# Patient Record
Sex: FEMALE | Race: BLACK OR AFRICAN AMERICAN | NOT HISPANIC OR LATINO | Employment: UNEMPLOYED | URBAN - METROPOLITAN AREA
[De-identification: names, ages, dates, MRNs, and addresses within clinical notes are randomized per-mention and may not be internally consistent; named-entity substitution may affect disease eponyms.]

---

## 2021-09-15 ENCOUNTER — TELEPHONE (OUTPATIENT)
Dept: OBGYN | Facility: CLINIC | Age: 21
End: 2021-09-15

## 2021-09-15 DIAGNOSIS — Z87.51 HISTORY OF PRETERM DELIVERY: Primary | ICD-10-CM

## 2021-09-15 NOTE — TELEPHONE ENCOUNTER
[facility] Call Center    Phone Message    May a detailed message be left on voicemail: no     Reason for Call: Other: Pt's Doctor in [place], Dr. Erica Nelson calling - Cell #: [phone].  Needing to get in New/Return Patient.  Had 1st baby at 33 Weeks: . Delivered here at [facility]...   Many Miscarriages and now Pregnant again at 20 Weeks 2d Gestational.   High Risk ORESTES:  Has Weekly progestersone injections: emeka & Every 2 Weeks has Cervical Length US (which were both done today on 9/15/2021) Patients CBR#: [phone] Maylin. Currently moving back to [facility].  Updated all information for Pt.     Please call Dr. Erica Berrios to get Patient scheduled and continue on with the treatment being done now.     Action Taken: Message routed to:  [facility] HECTOR    Travel Screening: Not Applicable

## 2021-09-15 NOTE — TELEPHONE ENCOUNTER
Left message for Dr Berrios to confirm-weekly 17 OHP injections beginning 9/22, Q2 week cervical lengths beginning 9/29, and OB visit due in 4 weeks.  I have held an appointment for ORESTES on 10/11 at 1030 with Dr Corbin.  Records are viewable in Care Everywhere ([facility])

## 2021-09-17 NOTE — TELEPHONE ENCOUNTER
Spoke with Dr Berrios regarding this patient.  She has moved from [place] to [place], and would like to transfer her care.  Patient is willing to travel to [place] if necessary for her cervical length US and her injections until she can be seen here.  Per Dr Berrios, she would only need a cervical length on 9/29, and then their recommendation would be to stop, as the next wouldn't be due until after patient is  24 weeks.    Will route to provider to determine if we can order her 17 OHP to start PA process, and order cervical length US or if she will need to continue care in [place] until she can establish here.

## 2021-09-20 NOTE — TELEPHONE ENCOUNTER
Called Maylin.  She states that she no longer plans to transfer care.  She would prefer to continue with Dr Berrios.    Left message for Dr Berrios's nurse, Merline at [phone] with this plan.        Arielle Montana MD  You 3 days ago     CT    Yes,   Cervical length when needed; 17 OHP weekly   See ob md on 10/11   Thank you    Message text       You  Arielle Montana MD 3 days ago     CF    Please advise-can we order her 17 OHP and cervical length, and then have her see MD 10/11?  OR, I could do ORESTES appt with CNM on 9/21.  Last option would be for pt to continue care in [place] until appt here on 10/11    Routing comment         the left paramedian falx and left posterior parietal convexity. 2.  Acute on chronic right subdural hematoma overlying the frontoparietal vertex. 3.  Mild subcortical restricted diffusion in the frontoparietal white matter and cortical restricted diffusion in the left greater than right parietal-occipital lobes in a nonvascular distribution of uncertain clinical  significance, possibly representing hypoxic-ischemic encephalopathy, post-ictal changes or cytotoxic edema related to recent trauma. 4.  Unremarkable MRA of the head. 5.  Unremarkable MRV of the head.  No dural venous sinus stenosis. 6.  Mild edema in the paraspinal musculature of the upper cervical spine which may be secondary to muscular strain.  Otherwise, unremarkable appearance of the cervical spine.      Head CT on 3/7/2020 - IMPRESSION: Unchanged size of mixed-density subdural hematomas along bilateral cerebral convexities, falx and tentorium. No midline shift or herniation.  EEG on 3/9/2020 - INTERPRETATION:  Abnormal 2-day video electroencephalogram, initial recording showed multiple electroclinical seizure activity that eventually resolved.  EEG by itself was suppressed and finding consistent with encephalopathy.      Head CT on 3/10/2020 - IMPRESSION: Postoperative appearance of interval drainage of subdural hematomas along bilateral cerebral convexities. No significant postoperative complications identified. Residual small-volume mixed-density subdural hemorrhage mainly along the posterior cerebral falx. No midline shift or brain herniation.      EEG on 3/17/2020 - INTERPRETATION:  Abnormal extended electroencephalogram, slowing of the background and more apparent slowing that localized to the left is indicative of underlying encephalopathy.      MRI brain on 3/18/2020 - INTRAPARENCHYMAL SPACES: There is no shift of midline indicators. The ventricles are stable.  The basal cisterns are visible. There is no sign of recent large territorial  arterial distribution infarct in the brain. There is no intraparenchymal hematoma or hemorrhagic contusion. There has been resolution of the previous diffusion abnormality in the cortex of the parietal occipital region. There are resolving subcortical  posterior frontal diffusion weighted abnormalities with two new punctate diffusion abnormalities over the high left parietal cortex which may represent microinfarcts, there are too superficial for axonal injury. IMPRESSION: 1.  Overall, stable and improving.      MRI cervical spine on 3/18/2020 - IMPRESSION: 1.   Stable, normal cervical spinal MRI examination.  Head ultrasound on 3/23/2020 - IMPRESSION: Examination is very limited. 1.  Right intraventricular cyst. 2.  Slight prominence of the lateral ventricles. 3.  Bilateral septated subdural collections.      EEG on 3/30/2020 - INTERPRETATION:  Abnormal electroencephalogram, slowing of the EEG tracing is indicative of underlying encephalopathy.  There is more focality to the left, there is no epileptiform activity during recording.      Head ultrasound on 4/8/2020 - IMPRESSION: Very limited exam. Again noted slight prominence of lateral ventricles and right intraventricular cyst. Small amount of extra-axial fluid.      MRI brain (fast) WO 11/10/2020 - IMPRESSION: Moderate dilatation of the lateral and third ventricles which has mildly progressed from prior MRI 03/18/2020. Interval resolution of previously visualized subdural hematomas.  No new extra-axial fluid collections are identified.    ASSESSMENT AND PLAN: Ivonne Larose with resolved hematomas. He had repeat EEG on 3/30/2020 showing no seizures, no epileptiform activity, but slowing and disorganization. He has been weaned off Keppra, Clonidine, and propranolol in the past with no further concerns for seizures. I would recommend that he continue to maximize all therapies he is receiving. I would recommend a follow up EEG at this time. Family to call following testing for results. Family to call in interim with any questions or concerns.        Ashwin Aguilera MD    Note: over 40 minutes spent speaking with family, with over 50% in consultation discussing diagnosis, treatment, and prognosis.

## 2022-05-16 ENCOUNTER — NURSE TRIAGE (OUTPATIENT)
Dept: NURSING | Facility: CLINIC | Age: 22
End: 2022-05-16
Payer: MEDICAID

## 2022-05-17 NOTE — TELEPHONE ENCOUNTER
Pt called stating since yesterday her feet has been tingling with pain rated 9/10. Pt stated her pain is constant and stops in one minutes and comes back. Pt reported her ankles area little swollen both. Pt also reported she has trouble sleeping. Pt reported she gave birth and has a syndrome 4 moths ago.     Per protocal pt was advised to be seen within 4 hours and she stated okay.      Reji Izquierdo RN  [facility]     COVID 19 Nurse Triage Plan/Patient Instructions    Please be aware that novel coronavirus (COVID-19) may be circulating in the community. If you develop symptoms such as fever, cough, or SOB or if you have concerns about the presence of another infection including coronavirus (COVID-19), please contact your health care provider or visit [email or web address].     Disposition/Instructions    In-Person Visit with provider recommended. Reference Visit Selection Guide.    Thank you for taking steps to prevent the spread of this virus.  o Limit your contact with others.  o Wear a simple mask to cover your cough.  o Wash your hands well and often.    Resources    [facility]: About COVID-19: [email or web address]    CDC: What to Do If You're Sick: www.cdc.gov/coronavirus/2019-ncov/about/steps-when-sick.html    CDC: Ending Home Isolation: www.cdc.gov/coronavirus/2019-ncov/hcp/disposition-in-home-patients.html     CDC: Caring for Someone: www.cdc.gov/coronavirus/2019-ncov/if-you-are-sick/care-for-someone.html     [facility]: Interim Guidance for Hospital Discharge to Home: www.health.[facility].mn.us/diseases/coronavirus/hcp/hospdischarge.pdf    [facility] clinical trials (COVID-19 research studies): [email or web address]-clinical-trials     Below are the COVID-19 hotlines at the [facility] ([facility]). Interpreters are available.   o For health questions: Call 468-605-3160 or 1-231.359.8868 (7 a.m. to 7 p.m.)  o For questions about schools  and childcare: Call 257-640-8504 or 1-536.843.2191 (7 a.m. to 7 p.m.)                                Reason for Disposition    [1] SEVERE pain (e.g., excruciating, unable to do any normal activities) AND [2] not improved after 2 hours of pain medicine    Additional Information    Negative: Followed a foot injury    Negative: Diabetes    Negative: Ankle pain is main symptom    Negative: Thigh or calf pain is main symptom    Negative: Entire foot is cool or blue in comparison to other foot    Negative: Purple or black skin on foot or toe    Negative: [1] Red area or streak AND [2] fever    Negative: [1] Swollen foot AND [2] fever    Negative: Patient sounds very sick or weak to the triager    Protocols used: FOOT PAIN-A-AH

## 2024-06-05 ENCOUNTER — HOSPITAL ENCOUNTER (EMERGENCY)
Facility: OTHER | Age: 24
Discharge: LEFT BEFORE DISCHARGE | End: 2024-06-05
Admitting: STUDENT IN AN ORGANIZED HEALTH CARE EDUCATION/TRAINING PROGRAM
Payer: COMMERCIAL

## 2024-06-05 VITALS
OXYGEN SATURATION: 100 % | BODY MASS INDEX: 20.89 KG/M2 | DIASTOLIC BLOOD PRESSURE: 63 MMHG | SYSTOLIC BLOOD PRESSURE: 95 MMHG | RESPIRATION RATE: 16 BRPM | TEMPERATURE: 97.8 F | HEART RATE: 95 BPM | HEIGHT: 66 IN | WEIGHT: 130 LBS

## 2024-06-05 LAB
ALBUMIN UR-MCNC: NEGATIVE MG/DL
APPEARANCE UR: CLEAR
BILIRUB UR QL STRIP: NEGATIVE
COLOR UR AUTO: YELLOW
GLUCOSE UR STRIP-MCNC: NEGATIVE MG/DL
HCG UR QL: NEGATIVE
HGB UR QL STRIP: NEGATIVE
KETONES UR STRIP-MCNC: NEGATIVE MG/DL
LEUKOCYTE ESTERASE UR QL STRIP: NEGATIVE
NITRATE UR QL: NEGATIVE
PH UR STRIP: 7.5 [PH] (ref 5–9)
RBC URINE: 1 /HPF
SP GR UR STRIP: 1.01 (ref 1–1.03)
UROBILINOGEN UR STRIP-MCNC: NORMAL MG/DL
WBC URINE: 1 /HPF

## 2024-06-05 PROCEDURE — 81025 URINE PREGNANCY TEST: CPT | Performed by: STUDENT IN AN ORGANIZED HEALTH CARE EDUCATION/TRAINING PROGRAM

## 2024-06-05 PROCEDURE — 99281 EMR DPT VST MAYX REQ PHY/QHP: CPT | Performed by: STUDENT IN AN ORGANIZED HEALTH CARE EDUCATION/TRAINING PROGRAM

## 2024-06-05 PROCEDURE — 81001 URINALYSIS AUTO W/SCOPE: CPT | Performed by: STUDENT IN AN ORGANIZED HEALTH CARE EDUCATION/TRAINING PROGRAM

## 2024-06-05 ASSESSMENT — COLUMBIA-SUICIDE SEVERITY RATING SCALE - C-SSRS
1. IN THE PAST MONTH, HAVE YOU WISHED YOU WERE DEAD OR WISHED YOU COULD GO TO SLEEP AND NOT WAKE UP?: NO
6. HAVE YOU EVER DONE ANYTHING, STARTED TO DO ANYTHING, OR PREPARED TO DO ANYTHING TO END YOUR LIFE?: NO
2. HAVE YOU ACTUALLY HAD ANY THOUGHTS OF KILLING YOURSELF IN THE PAST MONTH?: NO

## 2024-06-05 NOTE — ED TRIAGE NOTES
Pt presents to ED with c/o all over abd pain. Pt is unsure if she is pregnant but last menstrual cycle was 5/23. Pt reports being sexually active without birth control. Pt's abd started several days. Worse today.    Cyndy Rojas RN on 6/5/2024 at 11:00 AM       Triage Assessment (Adult)       Row Name 06/05/24 1059          Cognitive/Neuro/Behavioral WDL    Cognitive/Neuro/Behavioral WDL WDL

## 2025-06-08 ENCOUNTER — HOSPITAL ENCOUNTER (OUTPATIENT)
Facility: OTHER | Age: 25
Discharge: HOME | End: 2025-06-08
Attending: OBSTETRICS & GYNECOLOGY | Admitting: OBSTETRICS & GYNECOLOGY
Payer: COMMERCIAL

## 2025-06-08 VITALS
HEART RATE: 115 BPM | SYSTOLIC BLOOD PRESSURE: 101 MMHG | DIASTOLIC BLOOD PRESSURE: 67 MMHG | OXYGEN SATURATION: 100 % | RESPIRATION RATE: 16 BRPM

## 2025-06-08 DIAGNOSIS — Z36.89 ENCOUNTER FOR TRIAGE IN PREGNANT PATIENT: Primary | ICD-10-CM

## 2025-06-08 PROCEDURE — 999N000104 HC STATISTIC NO CHARGE: Performed by: FAMILY MEDICINE

## 2025-06-08 RX ORDER — LIDOCAINE 40 MG/G
CREAM TOPICAL
Status: DISCONTINUED | OUTPATIENT
Start: 2025-06-08 | End: 2025-06-08 | Stop reason: HOSPADM

## 2025-06-08 ASSESSMENT — ACTIVITIES OF DAILY LIVING (ADL): ADLS_ACUITY_SCORE: 41

## 2025-06-08 NOTE — PROGRESS NOTES
OB Triage Note  Maylin Calero  MRN: [id number]  Gestational Age: Unknown      Maylin Calero presents for decreased fetal movement. Patient is a , reports being 26+1 today. She states that yesterday her baby only moved 10 times throughout the whole day, but got a little more active at night. Then around 3-4 am she felt a sharp cramp in her stomach and has not felt baby move since. Patient said at the time of feeling the pain she noted she had a bloody nose and coughed up bloody sputum.    Patient has a hx of antiphospholipid syndrome and two  deliveries (33 weeks and 35 weeks). Patient reports she has a short cervix and is taking daily progesterone. Patient is supposed to be taking Lovenox daily, but says she has not taken it in two months because of injection pain. Patient also says the baby has a suspected VSD and has a confirmatory fetal echocardiogram scheduled in 2 weeks.    Patient stated she is due  and will be living here all summer. She says she would like to deliver baby here.     Dr Casey notified of arrival and condition.  Oriented patient to surroundings. Call light within reach.     FHT: 140s  NST Start Time: 0802  NST Stop Time: 08  NST: Appropriate for Gestational Age - Moderate variability.  Uterine Assessment: No contractions noted.    Plan:  NST appropriate for gestational age, no fetal concerns at this time.    Discussed patient with Dr. Carmela garcia for patient to discharge, patient may go back to ER if patient feels she needs to be seen for bloody sputum/bloody nose. Patient says she feels comfortable discharging home at this time.    Discussed delivery plan with patient - patient informed babies born less than 36 weeks gestation here must transfer to higher level of care as we do not have NICU, and since infant has suspected VSD we would transfer her to deliver somewhere with a NICU team if stable. Strongly encouraged patient to deliver where she is  receiving her prenatal care, at the [facility]. Also encouraged patient to take Lovenox as prescribed. Patient verbalized understanding.    Instructed patient to return to triage if she continues to have decreased movements or develops abdominal pain and contractions.     Discharge Note      Data:  Maylin Calero discharged to home at 0835 via ambulation. Accompanied by staff.    Action:  Written discharge/follow-up instructions were provided to patient. Prescriptions - None ordered for discharge. All belongings sent with patient.    Response:  Patient verbalized understanding of discharge instructions, reason for discharge, and necessary follow-up appointments. Without further questions or concerns at this time.     Stacey Chen RN

## 2025-06-08 NOTE — ED TRIAGE NOTES
Pt arrives via private vehicle from home with c/o decreased fetal movement and sharp stabbing pain in abdomen. Pt states that she is a hgh risk pregnancy, supposed to be taking blood thinners and has not been. Pt states yesterday there was decreased movement, then baby moved about ten times. This morning at 0330 pt had sharp pain and decreased movement since then.